# Patient Record
Sex: FEMALE | URBAN - METROPOLITAN AREA
[De-identification: names, ages, dates, MRNs, and addresses within clinical notes are randomized per-mention and may not be internally consistent; named-entity substitution may affect disease eponyms.]

---

## 2019-06-12 ENCOUNTER — APPOINTMENT (RX ONLY)
Dept: URBAN - METROPOLITAN AREA CLINIC 8 | Facility: CLINIC | Age: 49
Setting detail: DERMATOLOGY
End: 2019-06-12

## 2019-06-12 DIAGNOSIS — Z41.9 ENCOUNTER FOR PROCEDURE FOR PURPOSES OTHER THAN REMEDYING HEALTH STATE, UNSPECIFIED: ICD-10-CM

## 2019-06-12 PROCEDURE — ? COSMETIC CONSULTATION: COOLSCULPTING

## 2019-06-17 ENCOUNTER — APPOINTMENT (RX ONLY)
Dept: URBAN - METROPOLITAN AREA CLINIC 8 | Facility: CLINIC | Age: 49
Setting detail: DERMATOLOGY
End: 2019-06-17

## 2019-06-17 DIAGNOSIS — Z41.9 ENCOUNTER FOR PROCEDURE FOR PURPOSES OTHER THAN REMEDYING HEALTH STATE, UNSPECIFIED: ICD-10-CM

## 2019-06-17 PROCEDURE — ? COOLSCULPTING

## 2019-06-17 ASSESSMENT — LOCATION DETAILED DESCRIPTION DERM
LOCATION DETAILED: PERIUMBILICAL SKIN
LOCATION DETAILED: LEFT LATERAL ABDOMEN
LOCATION DETAILED: LEFT SUPRAPUBIC SKIN

## 2019-06-17 ASSESSMENT — LOCATION ZONE DERM: LOCATION ZONE: TRUNK

## 2019-06-17 ASSESSMENT — LOCATION SIMPLE DESCRIPTION DERM
LOCATION SIMPLE: GROIN
LOCATION SIMPLE: ABDOMEN

## 2019-06-17 NOTE — PROCEDURE: COOLSCULPTING
Time (Minutes - Will Only Render If Nonzero): 35
Time (Minutes - Will Only Render If Nonzero): 35
Device: Coolsculpting
Post Treatment: After treatment, the suction was turned off, and the applicator was removed from the skin.
Time (Minutes - Will Only Render If Nonzero): 0
Location 3: upper abdomen (left)
Location 4: upper abdomen (right)
Location 2: lower abdomen (right)
Suction Settings: The suction settings were per protocol.
Location 1: lower abdomen (left)
Detail Level: Zone
Intro: Prior to treatment, the area was cleaned with alcohol and marked out with a marking pen. The gel sheet was then applied uniformly. The applicator was applied to the skin with good contact and suction.
Applicator Size: CoolAdvantage Fit
Suction Settings: The suction settings were per protocol. Thermal event pt r/s 48 hours later
Treatment Administered By (Optional): Fina martinez
Applicator Size: CoolAdvantage Curve
Applicator Size: CoolAdvantage Core
Consent: Written consent obtained, risks reviewed including, but not limited to, blistering from suction, darker or lighter pigmentary change, bruising, and/or need for multiple treatments.
Price (Use Numbers Only, No Special Characters Or $): 8935.00

## 2019-12-06 ENCOUNTER — APPOINTMENT (RX ONLY)
Dept: URBAN - METROPOLITAN AREA CLINIC 42 | Facility: CLINIC | Age: 49
Setting detail: DERMATOLOGY
End: 2019-12-06

## 2019-12-06 DIAGNOSIS — Z41.9 ENCOUNTER FOR PROCEDURE FOR PURPOSES OTHER THAN REMEDYING HEALTH STATE, UNSPECIFIED: ICD-10-CM

## 2019-12-06 PROCEDURE — ? COOLSCULPTING

## 2019-12-06 ASSESSMENT — LOCATION ZONE DERM: LOCATION ZONE: TRUNK

## 2019-12-06 ASSESSMENT — LOCATION DETAILED DESCRIPTION DERM
LOCATION DETAILED: PERIUMBILICAL SKIN
LOCATION DETAILED: LEFT SUPRAPUBIC SKIN
LOCATION DETAILED: RIGHT SUPRAPUBIC SKIN

## 2019-12-06 ASSESSMENT — LOCATION SIMPLE DESCRIPTION DERM
LOCATION SIMPLE: ABDOMEN
LOCATION SIMPLE: GROIN

## 2020-03-13 ENCOUNTER — APPOINTMENT (RX ONLY)
Dept: URBAN - METROPOLITAN AREA CLINIC 42 | Facility: CLINIC | Age: 50
Setting detail: DERMATOLOGY
End: 2020-03-13

## 2020-03-13 DIAGNOSIS — Z41.9 ENCOUNTER FOR PROCEDURE FOR PURPOSES OTHER THAN REMEDYING HEALTH STATE, UNSPECIFIED: ICD-10-CM

## 2020-03-13 PROCEDURE — ? COOLSCULPTING

## 2020-03-13 PROCEDURE — ? ADDITIONAL NOTES

## 2020-03-13 ASSESSMENT — LOCATION DETAILED DESCRIPTION DERM: LOCATION DETAILED: LEFT SUPERIOR CENTRAL MALAR CHEEK

## 2020-03-13 ASSESSMENT — LOCATION SIMPLE DESCRIPTION DERM: LOCATION SIMPLE: LEFT CHEEK

## 2020-03-13 ASSESSMENT — LOCATION ZONE DERM: LOCATION ZONE: FACE

## 2020-03-13 NOTE — PROCEDURE: COOLSCULPTING
Time (Minutes - Will Only Render If Nonzero): 35
Time (Minutes - Will Only Render If Nonzero): 0
Time (Minutes - Will Only Render If Nonzero): 35
Treatment Administered By (Optional): Fina martinez
Applicator Size: CoolAdvantage Core
Post Treatment: After treatment, the suction was turned off, and the applicator was removed from the skin.
Suction Settings: The suction settings were per protocol.
Applicator Size: CoolAdvantage Curve
Location 1: lower abdomen
Applicator Size: CoolAdvantage Fit
Suction Settings: The suction settings were per protocol. Thermal event pt r/s 48 hours later
Intro: Prior to treatment, the area was cleaned with alcohol and marked out with a marking pen. The gel sheet was then applied uniformly. The applicator was applied to the skin with good contact and suction.
Device: Coolsculpting
Consent: Written consent obtained, risks reviewed including, but not limited to, blistering from suction, darker or lighter pigmentary change, bruising, and/or need for multiple treatments.
Detail Level: Zone

## 2020-03-13 NOTE — PROCEDURE: ADDITIONAL NOTES
Detail Level: Simple
Additional Notes: Discussed Botox and chemical peel with pt \\nRecommended gloss peel